# Patient Record
Sex: FEMALE | Race: BLACK OR AFRICAN AMERICAN | Employment: UNEMPLOYED | URBAN - METROPOLITAN AREA
[De-identification: names, ages, dates, MRNs, and addresses within clinical notes are randomized per-mention and may not be internally consistent; named-entity substitution may affect disease eponyms.]

---

## 2018-01-30 ENCOUNTER — HOSPITAL ENCOUNTER (EMERGENCY)
Age: 23
Discharge: HOME | End: 2018-01-31
Attending: EMERGENCY MEDICINE
Payer: MEDICAID

## 2018-01-30 DIAGNOSIS — A60.04 HERPES SIMPLEX VULVOVAGINITIS: Primary | ICD-10-CM

## 2018-01-30 DIAGNOSIS — N76.0 BV (BACTERIAL VAGINOSIS): ICD-10-CM

## 2018-01-30 DIAGNOSIS — N39.0 URINARY TRACT INFECTION WITHOUT HEMATURIA, SITE UNSPECIFIED: ICD-10-CM

## 2018-01-30 DIAGNOSIS — B96.89 BV (BACTERIAL VAGINOSIS): ICD-10-CM

## 2018-01-30 LAB
APPEARANCE UR: ABNORMAL
BACTERIA URNS QL MICRO: ABNORMAL /HPF
BILIRUB UR QL: ABNORMAL
COLOR UR: ABNORMAL
EPI CELLS #/AREA URNS HPF: ABNORMAL /HPF
GLUCOSE UR STRIP.AUTO-MCNC: NEGATIVE MG/DL
HCG UR QL: NEGATIVE
HGB UR QL STRIP: ABNORMAL
KETONES UR QL STRIP.AUTO: ABNORMAL MG/DL
LEUKOCYTE ESTERASE UR QL STRIP.AUTO: ABNORMAL
NITRITE UR QL STRIP.AUTO: POSITIVE
OTHER OBSERVATIONS,UCOM: ABNORMAL
PH UR STRIP: 6 [PH] (ref 5–9)
PROT UR STRIP-MCNC: 30 MG/DL
RBC #/AREA URNS HPF: ABNORMAL /HPF
SP GR UR REFRACTOMETRY: 1.03 (ref 1–1.02)
UROBILINOGEN UR QL STRIP.AUTO: 1 EU/DL (ref 0.2–1)
WBC URNS QL MICRO: >100 /HPF
YEAST URNS QL MICRO: ABNORMAL

## 2018-01-30 PROCEDURE — 87491 CHLMYD TRACH DNA AMP PROBE: CPT | Performed by: EMERGENCY MEDICINE

## 2018-01-30 PROCEDURE — 99285 EMERGENCY DEPT VISIT HI MDM: CPT | Performed by: EMERGENCY MEDICINE

## 2018-01-30 PROCEDURE — 81025 URINE PREGNANCY TEST: CPT

## 2018-01-30 PROCEDURE — 81003 URINALYSIS AUTO W/O SCOPE: CPT | Performed by: EMERGENCY MEDICINE

## 2018-01-30 PROCEDURE — 87255 GENET VIRUS ISOLATE HSV: CPT | Performed by: EMERGENCY MEDICINE

## 2018-01-30 PROCEDURE — 87210 SMEAR WET MOUNT SALINE/INK: CPT | Performed by: EMERGENCY MEDICINE

## 2018-01-30 NOTE — LETTER
2/3/2018 [address] [place] [id number] Dear Ms. Katlyn, You were recently seen in the Emergency Department of [facility] and had blood work or x-rays performed. We would like to discuss these with you. Please call the Emergency Department at your earliest convenience. If you were seen at [facility], please dial [phone], and if you were seen at [facility], please call [phone] to speak with one of our providers. Sincerely, Jodine Mcburney, MD 
Medical Director Emergency Services, [address] [place] [address]  
[phone]/ [phone]

## 2018-01-30 NOTE — Clinical Note
Antiviral for probable herpes outbreakacyclovir 200 mg 5 times daily for 10 days Arterial vaginosisFlagyl 500 mg twice daily Urinary tract infection changesLevaquin 500 milligrams

## 2018-01-31 VITALS
OXYGEN SATURATION: 100 % | DIASTOLIC BLOOD PRESSURE: 70 MMHG | HEART RATE: 90 BPM | HEIGHT: 67 IN | RESPIRATION RATE: 16 BRPM | TEMPERATURE: 98.7 F | WEIGHT: 206 LBS | SYSTOLIC BLOOD PRESSURE: 122 MMHG | BODY MASS INDEX: 32.33 KG/M2

## 2018-01-31 LAB
SERVICE CMNT-IMP: NORMAL
WET PREP GENITAL: NORMAL
WET PREP GENITAL: NORMAL

## 2018-01-31 RX ORDER — LEVOFLOXACIN 500 MG/1
500 TABLET, FILM COATED ORAL DAILY
Qty: 7 TAB | Refills: 0 | Status: SHIPPED | OUTPATIENT
Start: 2018-01-31 | End: 2018-02-07

## 2018-01-31 RX ORDER — METRONIDAZOLE 500 MG/1
500 TABLET ORAL 2 TIMES DAILY
Qty: 14 TAB | Refills: 0 | Status: SHIPPED | OUTPATIENT
Start: 2018-01-31 | End: 2018-02-07

## 2018-01-31 RX ORDER — ACYCLOVIR 200 MG/1
200 CAPSULE ORAL
Qty: 50 CAP | Refills: 0 | Status: SHIPPED | OUTPATIENT
Start: 2018-01-31 | End: 2018-02-10

## 2018-01-31 NOTE — ED PROVIDER NOTES
HPI Comments: hhe was significant vaginal discharge and pelvic discomfort. She delivered on November 19 of 2017. She did not resume intercourse until after a checkup early in January. She has quite a bit of drainage in the perineal region is uncertain whether she might have an abscess or other cause for this. Denies any fever or systemic changes. Unaware of any issues as far as STD-type considerations and her partner. She has never had herpes. No fever or shaking chills. patient is not breast-feeding. No oral genital contact    Patient is a 25 y.o. female presenting with female genitourinary complaint. The history is provided by the patient. Vaginal Pain    This is a new problem. The discharge was malodorous and yellow. She is not pregnant. She has not missed her period. Associated symptoms include dysuria, genital lesions and perineal odor. Pertinent negatives include no fever, no diarrhea and no nausea. History reviewed. No pertinent past medical history. Past Surgical History:   Procedure Laterality Date    HX HEENT      wisdom teeth extracted. History reviewed. No pertinent family history. Social History     Social History    Marital status: SINGLE     Spouse name: N/A    Number of children: N/A    Years of education: N/A     Occupational History    Not on file. Social History Main Topics    Smoking status: Never Smoker    Smokeless tobacco: Never Used    Alcohol use No    Drug use: Not on file    Sexual activity: Not on file     Other Topics Concern    Not on file     Social History Narrative         ALLERGIES: Amoxicillin and Pollen extracts    Review of Systems   Constitutional: Negative for fever. Gastrointestinal: Negative for diarrhea and nausea. Genitourinary: Positive for dysuria, vaginal discharge and vaginal pain. All other systems reviewed and are negative.       Vitals:    01/30/18 1920   BP: 144/75   Pulse: (!) 107   Resp: 16   Temp: 98.9 °F (37.2 °C)   SpO2: 100%   Weight: 93.4 kg (206 lb)   Height: 5' 6.5\" (1.689 m)            Physical Exam   Constitutional: She appears well-developed and well-nourished. No distress. HENT:   Head: Atraumatic. Mouth/Throat: No oropharyngeal exudate. Eyes: No scleral icterus. Neck: Neck supple. Cardiovascular: Normal rate and intact distal pulses. Pulmonary/Chest: Effort normal. No respiratory distress. She has no wheezes. Abdominal: Soft. She exhibits no distension. There is no rebound and no guarding. Mild soreness across the pelvic brim   Genitourinary: There is lesion on the right labia. There is lesion on the left labia. Uterus is not tender. Cervix exhibits no motion tenderness. Right adnexum displays no fullness. Left adnexum displays no fullness. Vaginal discharge found. Genitourinary Comments: Multiple lesions both vaginally and in the perineal region that are consistent with herpetic ulcerations are no current intact vesicles. She also has a malodorous clear yellow discharge. Musculoskeletal: Normal range of motion. Neurological: She is alert. Skin: Skin is warm and dry. Psychiatric: Thought content normal.   Nursing note and vitals reviewed. MDM  Number of Diagnoses or Management Options  BV (bacterial vaginosis):   Herpes simplex vulvovaginitis:   Urinary tract infection without hematuria, site unspecified:   Diagnosis management comments: ppatient with what appears to be a initial herpetic outbreak. Her GYN exam also shows findings consistent with possible bacterial vaginosis. Urinalysis which has minimal epithelial cells is significantly abnormal in the also be a currently active issue due to resistance and reluctance to void due to discomfort. Patient is penicillin allergic. We will place her on a fluoroquinolone for her urine and possible coverage of some STD issues. She will be placed on acyclovir for her herpetic outbreak.   She is aware that there will be further studies that will be reported  In her chart in 2 days. Patient is unaware of any STD symptoms and her partner.        Amount and/or Complexity of Data Reviewed  Clinical lab tests: ordered and reviewed    Risk of Complications, Morbidity, and/or Mortality  Presenting problems: moderate  Diagnostic procedures: low  Management options: moderate    Patient Progress  Patient progress: stable        ED Course       Procedures  Recent Results (from the past 12 hour(s))   URINALYSIS W/ RFLX MICROSCOPIC    Collection Time: 01/30/18  7:35 PM   Result Value Ref Range    Color PADMINI      Appearance TURBID      Specific gravity 1.026 (H) 1.001 - 1.023      pH (UA) 6.0 5.0 - 9.0      Protein 30 (A) NEG mg/dL    Glucose NEGATIVE  NEG mg/dL    Ketone TRACE (A) NEG mg/dL    Bilirubin SMALL (A) NEG      Blood TRACE (A) NEG      Urobilinogen 1.0 0.2 - 1.0 EU/dL    Nitrites POSITIVE (A) NEG      Leukocyte Esterase LARGE (A) NEG      WBC >100 0 /hpf    RBC 0-3 0 /hpf    Epithelial cells 0-3 0 /hpf    Bacteria 2+ (H) 0 /hpf    Yeast OCCASIONAL      Other observations BUDDING YEAST FORMS SEEN     HCG URINE, QL. - POC    Collection Time: 01/30/18  7:38 PM   Result Value Ref Range    Pregnancy test,urine (POC) NEGATIVE  NEG     WET PREP    Collection Time: 01/30/18 11:53 PM   Result Value Ref Range    Special Requests: NO SPECIAL REQUESTS      Wet prep 15 TO 20  WBC'S       Wet prep        MODERATE  CLUE CELLS PRESENT  NO YEAST SEEN  NO TRICHOMONAS SEEN

## 2018-01-31 NOTE — ED TRIAGE NOTES
Pt c/o vagina pain and drainage for 2 days. States that she has noticed a foul odor.   States that she had a yeast infection during

## 2018-01-31 NOTE — DISCHARGE INSTRUCTIONS
Bacterial Vaginosis: Care Instructions  Your Care Instructions    Bacterial vaginosis is a type of vaginal infection. It is caused by excess growth of certain bacteria that are normally found in the vagina. Symptoms can include itching, swelling, pain when you urinate or have sex, and a gray or yellow discharge with a \"fishy\" odor. It is not considered an infection that is spread through sexual contact. Although symptoms can be annoying and uncomfortable, bacterial vaginosis does not usually cause other health problems. However, if you have it while you are pregnant, it can cause complications. While the infection may go away on its own, most doctors use antibiotics to treat it. You may have been prescribed pills or vaginal cream. With treatment, bacterial vaginosis usually clears up in 5 to 7 days. Follow-up care is a key part of your treatment and safety. Be sure to make and go to all appointments, and call your doctor if you are having problems. It's also a good idea to know your test results and keep a list of the medicines you take. How can you care for yourself at home? · Take your antibiotics as directed. Do not stop taking them just because you feel better. You need to take the full course of antibiotics. · Do not eat or drink anything that contains alcohol if you are taking metronidazole (Flagyl). · Keep using your medicine if you start your period. Use pads instead of tampons while using a vaginal cream or suppository. Tampons can absorb the medicine. · Wear loose cotton clothing. Do not wear nylon and other materials that hold body heat and moisture close to the skin. · Do not scratch. Relieve itching with a cold pack or a cool bath. · Do not wash your vaginal area more than once a day. Use plain water or a mild, unscented soap. Do not douche. When should you call for help?   Watch closely for changes in your health, and be sure to contact your doctor if:  ? · You have unexpected vaginal bleeding. ? · You have a fever. ? · You have new or increased pain in your vagina or pelvis. ? · You are not getting better after 1 week. ? · Your symptoms return after you finish the course of your medicine. Where can you learn more? Go to http://sol-kerry.info/. Patel Rhodes in the search box to learn more about \"Bacterial Vaginosis: Care Instructions. \"  Current as of: October 13, 2016  Content Version: 11.4  © 1087-8774 [employer]. Care instructions adapted under license by [employer] (which disclaims liability or warranty for this information). If you have questions about a medical condition or this instruction, always ask your healthcare professional. [address] any warranty or liability for your use of this information. Genital Herpes: Care Instructions  Your Care Instructions  Genital herpes is caused by a virus called herpes simplex. There are two types of this virus. Type 2 is the type that usually causes genital herpes. But type 1 can also cause it. Type 1 is the type that causes cold sores. Genital herpes is a sexually transmitted infection (STI). The most common way to get it is through sexual or other contact with someone who has herpes. For example, the virus can spread from a sore in the genital area to the lips. And it can spread from a cold sore on the lips to the genital area. Some people are surprised to find out that they have herpes or that they gave it to someone else. This is because a lot of people who have it don't know that they have it. They may not get sores or they may have sores that they cannot see. But the virus can still be spread by a person who does not have obvious sores or symptoms. There is no cure for herpes, but antiviral medicine can help you feel better and help prevent more outbreaks. This medicine may also lower the chance of spreading the virus.   Follow-up care is a key part of your treatment and safety. Be sure to make and go to all appointments, and call your doctor if you are having problems. It's also a good idea to know your test results and keep a list of the medicines you take. How can you care for yourself at home? · Be safe with medicines. If your doctor prescribes medicine, take it exactly as prescribed. Call your doctor if you think you are having a problem with your medicine. You will get more details on the specific medicines your doctor prescribes. · To reduce the pain and itching from herpes sores:  ¨ Wash the area with water 3 or 4 times a day. ¨ Keep the sores clean and dry in between baths or showers. You may want to let the sores air dry. This may feel better than a towel. ¨ Wear cotton underwear. Cotton absorbs moisture well. ¨ Take an over-the-counter pain medicine, such as acetaminophen (Tylenol), ibuprofen (Advil, Motrin), or naproxen (Aleve). Read and follow all instructions on the label. Do not take two or more pain medicines at the same time unless the doctor told you to. Many pain medicines have acetaminophen, which is Tylenol. Too much acetaminophen (Tylenol) can be harmful. To prevent the spread of genital herpes  · Latex condoms are a good way to reduce the risk of spreading the virus. But you can still get the virus even if you use a condom. For the best protection, use condoms every time you have sex, from the beginning to the end of sexual contact. Remember that you can infect someone even if you do not have obvious symptoms or sores. · Avoid sexual contact when you have symptoms. Symptoms include sores, tingling, or pain. · Wash your hands if you touch a sore. In some cases, especially if this is your first herpes outbreak, you can spread the virus to other parts of your body or to other people. · Having one sex partner (who does not have STIs and does not have sex with anyone else) is a good way to avoid STIs.   · Talk to your sex partner or partners about genital herpes. · Encourage your sex partners to get a blood test to see if they have been infected. When should you call for help? Call your doctor now or seek immediate medical care if:  ? · You have a new fever. ? · There is increasing redness or red streaks around herpes sores. ? Watch closely for changes in your health, and be sure to contact your doctor if:  ? · You have herpes and you think you might be pregnant. ? · You have an outbreak of herpes sores, and the sores are not healing. ? · You have frequent outbreaks of genital herpes sores. ? · You are unable to pass urine or are constipated. ? · You want to start antiviral medicine. ? · You do not get better as expected. Where can you learn more? Go to http://sol-kerry.info/. Enter M673 in the search box to learn more about \"Genital Herpes: Care Instructions. \"  Current as of: March 20, 2017  Content Version: 11.4  © 4409-5581 [employer]. Care instructions adapted under license by [employer] (which disclaims liability or warranty for this information). If you have questions about a medical condition or this instruction, always ask your healthcare professional. [place] [address] any warranty or liability for your use of this information. Urinary Tract Infection in Women: Care Instructions  Your Care Instructions    A urinary tract infection, or UTI, is a general term for an infection anywhere between the kidneys and the urethra (where urine comes out). Most UTIs are bladder infections. They often cause pain or burning when you urinate. UTIs are caused by bacteria and can be cured with antibiotics. Be sure to complete your treatment so that the infection goes away. Follow-up care is a key part of your treatment and safety. Be sure to make and go to all appointments, and call your doctor if you are having problems.  It's also a good idea to know your test results and keep a list of the medicines you take. How can you care for yourself at home? · Take your antibiotics as directed. Do not stop taking them just because you feel better. You need to take the full course of antibiotics. · Drink extra water and other fluids for the next day or two. This may help wash out the bacteria that are causing the infection. (If you have kidney, heart, or liver disease and have to limit fluids, talk with your doctor before you increase your fluid intake.)  · Avoid drinks that are carbonated or have caffeine. They can irritate the bladder. · Urinate often. Try to empty your bladder each time. · To relieve pain, take a hot bath or lay a heating pad set on low over your lower belly or genital area. Never go to sleep with a heating pad in place. To prevent UTIs  · Drink plenty of water each day. This helps you urinate often, which clears bacteria from your system. (If you have kidney, heart, or liver disease and have to limit fluids, talk with your doctor before you increase your fluid intake.)  · Urinate when you need to. · Urinate right after you have sex. · Change sanitary pads often. · Avoid douches, bubble baths, feminine hygiene sprays, and other feminine hygiene products that have deodorants. · After going to the bathroom, wipe from front to back. When should you call for help? Call your doctor now or seek immediate medical care if:  ? · Symptoms such as fever, chills, nausea, or vomiting get worse or appear for the first time. ? · You have new pain in your back just below your rib cage. This is called flank pain. ? · There is new blood or pus in your urine. ? · You have any problems with your antibiotic medicine. ? Watch closely for changes in your health, and be sure to contact your doctor if:  ? · You are not getting better after taking an antibiotic for 2 days. ? · Your symptoms go away but then come back. Where can you learn more?   Go to http://sol-kerry.info/. Enter R087 in the search box to learn more about \"Urinary Tract Infection in Women: Care Instructions. \"  Current as of: May 12, 2017  Content Version: 11.4  © 3553-7764 [employer], [employer]. Care instructions adapted under license by [employer] (which disclaims liability or warranty for this information). If you have questions about a medical condition or this instruction, always ask your healthcare professional. [address] any warranty or liability for your use of this information.

## 2018-01-31 NOTE — ED NOTES
I have reviewed discharge instructions with the patient. The patient verbalized understanding. Patient left ED via Discharge Method: ambulatory to Home with self. Opportunity for questions and clarification provided. Patient given 3 scripts. To continue your aftercare when you leave the hospital, you may receive an automated call from our care team to check in on how you are doing. This is a free service and part of our promise to provide the best care and service to meet your aftercare needs.  If you have questions, or wish to unsubscribe from this service please call [phone]. Thank you for Choosing our [facility].

## 2018-02-01 LAB
C TRACH RRNA SPEC QL NAA+PROBE: NEGATIVE
N GONORRHOEA RRNA SPEC QL NAA+PROBE: NEGATIVE
SPECIMEN SOURCE: NORMAL

## 2018-02-02 LAB
C TRACH RRNA SPEC QL NAA+PROBE: NEGATIVE
HSV SPEC CULT: ABNORMAL
N GONORRHOEA RRNA SPEC QL NAA+PROBE: NEGATIVE
SPECIMEN SOURCE: ABNORMAL
SPECIMEN SOURCE: NORMAL